# Patient Record
Sex: MALE | Race: WHITE | Employment: FULL TIME | URBAN - METROPOLITAN AREA
[De-identification: names, ages, dates, MRNs, and addresses within clinical notes are randomized per-mention and may not be internally consistent; named-entity substitution may affect disease eponyms.]

---

## 2018-07-18 ENCOUNTER — TELEPHONE (OUTPATIENT)
Dept: ORTHOPEDIC SURGERY | Age: 83
End: 2018-07-18

## 2021-08-27 ENCOUNTER — APPOINTMENT (OUTPATIENT)
Dept: GENERAL RADIOLOGY | Age: 86
End: 2021-08-27
Payer: MEDICARE

## 2021-08-27 ENCOUNTER — APPOINTMENT (OUTPATIENT)
Dept: CT IMAGING | Age: 86
End: 2021-08-27
Payer: MEDICARE

## 2021-08-27 ENCOUNTER — HOSPITAL ENCOUNTER (EMERGENCY)
Age: 86
Discharge: HOME | End: 2021-08-28
Attending: EMERGENCY MEDICINE
Payer: MEDICARE

## 2021-08-27 DIAGNOSIS — R53.1 GENERALIZED WEAKNESS: Primary | ICD-10-CM

## 2021-08-27 DIAGNOSIS — F03.91 DEMENTIA WITH BEHAVIORAL DISTURBANCE, UNSPECIFIED DEMENTIA TYPE: ICD-10-CM

## 2021-08-27 LAB
BASOPHILS ABSOLUTE: 0 K/UL (ref 0–0.2)
BASOPHILS RELATIVE PERCENT: 0.4 %
EOSINOPHILS ABSOLUTE: 0.1 K/UL (ref 0–0.6)
EOSINOPHILS RELATIVE PERCENT: 1 %
HCT VFR BLD CALC: 42.1 % (ref 40.5–52.5)
HEMOGLOBIN: 14 G/DL (ref 13.5–17.5)
LYMPHOCYTES ABSOLUTE: 1.1 K/UL (ref 1–5.1)
LYMPHOCYTES RELATIVE PERCENT: 11.6 %
MCH RBC QN AUTO: 30.6 PG (ref 26–34)
MCHC RBC AUTO-ENTMCNC: 33.2 G/DL (ref 31–36)
MCV RBC AUTO: 92.2 FL (ref 80–100)
MONOCYTES ABSOLUTE: 1 K/UL (ref 0–1.3)
MONOCYTES RELATIVE PERCENT: 10.8 %
NEUTROPHILS ABSOLUTE: 6.9 K/UL (ref 1.7–7.7)
NEUTROPHILS RELATIVE PERCENT: 76.2 %
PDW BLD-RTO: 14.4 % (ref 12.4–15.4)
PLATELET # BLD: 363 K/UL (ref 135–450)
PMV BLD AUTO: 7.6 FL (ref 5–10.5)
RBC # BLD: 4.56 M/UL (ref 4.2–5.9)
WBC # BLD: 9.1 K/UL (ref 4–11)

## 2021-08-27 PROCEDURE — 83690 ASSAY OF LIPASE: CPT

## 2021-08-27 PROCEDURE — 81003 URINALYSIS AUTO W/O SCOPE: CPT

## 2021-08-27 PROCEDURE — 84484 ASSAY OF TROPONIN QUANT: CPT

## 2021-08-27 PROCEDURE — 71045 X-RAY EXAM CHEST 1 VIEW: CPT

## 2021-08-27 PROCEDURE — 80053 COMPREHEN METABOLIC PANEL: CPT

## 2021-08-27 PROCEDURE — 70450 CT HEAD/BRAIN W/O DYE: CPT

## 2021-08-27 PROCEDURE — 93005 ELECTROCARDIOGRAM TRACING: CPT | Performed by: PHYSICIAN ASSISTANT

## 2021-08-27 PROCEDURE — 99283 EMERGENCY DEPT VISIT LOW MDM: CPT

## 2021-08-27 PROCEDURE — 85025 COMPLETE CBC W/AUTO DIFF WBC: CPT

## 2021-08-27 ASSESSMENT — ENCOUNTER SYMPTOMS
NAUSEA: 0
VOMITING: 0
ABDOMINAL PAIN: 0
RHINORRHEA: 0
COUGH: 0
WHEEZING: 0
SHORTNESS OF BREATH: 0
DIARRHEA: 0

## 2021-08-28 VITALS
BODY MASS INDEX: 23.34 KG/M2 | OXYGEN SATURATION: 96 % | WEIGHT: 149 LBS | TEMPERATURE: 98 F | RESPIRATION RATE: 16 BRPM | DIASTOLIC BLOOD PRESSURE: 97 MMHG | SYSTOLIC BLOOD PRESSURE: 189 MMHG | HEART RATE: 71 BPM

## 2021-08-28 LAB
A/G RATIO: 1.1 (ref 1.1–2.2)
ALBUMIN SERPL-MCNC: 3.7 G/DL (ref 3.4–5)
ALP BLD-CCNC: 102 U/L (ref 40–129)
ALT SERPL-CCNC: 11 U/L (ref 10–40)
ANION GAP SERPL CALCULATED.3IONS-SCNC: 13 MMOL/L (ref 3–16)
AST SERPL-CCNC: 21 U/L (ref 15–37)
BILIRUB SERPL-MCNC: 0.4 MG/DL (ref 0–1)
BILIRUBIN URINE: NEGATIVE
BLOOD, URINE: NEGATIVE
BUN BLDV-MCNC: 25 MG/DL (ref 7–20)
CALCIUM SERPL-MCNC: 9 MG/DL (ref 8.3–10.6)
CHLORIDE BLD-SCNC: 100 MMOL/L (ref 99–110)
CLARITY: CLEAR
CO2: 21 MMOL/L (ref 21–32)
COLOR: YELLOW
CREAT SERPL-MCNC: 1.1 MG/DL (ref 0.8–1.3)
EKG ATRIAL RATE: 75 BPM
EKG DIAGNOSIS: NORMAL
EKG P AXIS: 32 DEGREES
EKG P-R INTERVAL: 190 MS
EKG Q-T INTERVAL: 414 MS
EKG QRS DURATION: 80 MS
EKG QTC CALCULATION (BAZETT): 462 MS
EKG R AXIS: 15 DEGREES
EKG T AXIS: 83 DEGREES
EKG VENTRICULAR RATE: 75 BPM
GFR AFRICAN AMERICAN: >60
GFR NON-AFRICAN AMERICAN: >60
GLOBULIN: 3.5 G/DL
GLUCOSE BLD-MCNC: 102 MG/DL (ref 70–99)
GLUCOSE URINE: NEGATIVE MG/DL
KETONES, URINE: NEGATIVE MG/DL
LEUKOCYTE ESTERASE, URINE: NEGATIVE
LIPASE: 48 U/L (ref 13–60)
MICROSCOPIC EXAMINATION: NORMAL
NITRITE, URINE: NEGATIVE
PH UA: 5.5 (ref 5–8)
POTASSIUM SERPL-SCNC: 4.4 MMOL/L (ref 3.5–5.1)
PROTEIN UA: NEGATIVE MG/DL
SODIUM BLD-SCNC: 134 MMOL/L (ref 136–145)
SPECIFIC GRAVITY UA: 1.02 (ref 1–1.03)
TOTAL PROTEIN: 7.2 G/DL (ref 6.4–8.2)
TROPONIN: <0.01 NG/ML
URINE REFLEX TO CULTURE: NORMAL
URINE TYPE: NORMAL
UROBILINOGEN, URINE: 0.2 E.U./DL

## 2021-08-28 PROCEDURE — 93010 ELECTROCARDIOGRAM REPORT: CPT | Performed by: INTERNAL MEDICINE

## 2021-08-28 NOTE — ED TRIAGE NOTES
Pt lives alone and family states they live 1.5hrs away and last checked on pt approxx. 2 weeks ago. Pt has seemingly \"declined\" with ADLs significantly.

## 2021-08-28 NOTE — ED PROVIDER NOTES
I independently performed a history and physical on Supriya Bennett. All diagnostic, treatment, and disposition decisions were made by myself in conjunction with the advanced practice provider. Briefly, this is a 80 y.o. male here for few weeks of mental status changes. He is an 59-year-old gentleman who lives alone after his wife . He has some family who live about an hour and half away to check on balance a week. Over the past few weeks to months has been going downhill became more forgetful having more episodes of incontinence having hard to keep his house clean and wife still drives to the grocery store and drives to [place] to get around and has been going downhill. Today with his niece was visiting him to go out to lunch at the fish is a big boy a  there who sees her father daily basis was able to relate these been coming in for 5 times a day and forgetting that he already been there. On exam, he is well-appearing male no acute distress. Is awake and alert to name and year and location. Month or day he cannot give. Reasonable insight. States he has no complaints. Heart regular lungs clear abdomen soft neuro is nonfocal besides the obvious dementia. EKG   EKG is reviewed myself. Dated today . Rate 75 normal sinus rhythm is a PAC. Normal EKG. N compared to his prior EKG from 2012 at that time he had diffuse ischemic changes in the lateral precordial leads are not present today     Hattie Chawla MD  21 [id number]        Screenings              MDM  He is demented unfortunately. His labs and CT imaging are benign. His urinalysis shows no acute process. This juncture is safe for discharge home but is going to need help with other home care or facilities near future. Family at bedside are aware and are trying to arrange this with patient.     Patient Referrals:  Rico Nicolas, [address] [id number] [phone]            Discharge Medications:  New Prescriptions    No medications on file       FINAL IMPRESSION  1. Generalized weakness    2. Dementia with behavioral disturbance, unspecified dementia type (HCC)        Blood pressure (!) 188/99, pulse 75, temperature 98 °F (36.7 °C), resp. rate 17, weight 149 lb (67.6 kg), SpO2 98 %. For further details of [facility] emergency department encounter, please see documentation by advanced practice provider, Elijah Osman.          Jose Enrique Bundy MD  08/28/21 8345

## 2022-03-05 ENCOUNTER — APPOINTMENT (OUTPATIENT)
Dept: CT IMAGING | Age: 87
End: 2022-03-05
Payer: MEDICARE

## 2022-03-05 ENCOUNTER — HOSPITAL ENCOUNTER (OUTPATIENT)
Age: 87
Setting detail: OBSERVATION
Discharge: NURSING, REHAB OR LONG-TERM CARE | End: 2022-03-07
Attending: EMERGENCY MEDICINE | Admitting: FAMILY MEDICINE
Payer: MEDICARE

## 2022-03-05 DIAGNOSIS — R53.1 GENERALIZED WEAKNESS: Primary | ICD-10-CM

## 2022-03-05 DIAGNOSIS — R77.8 ELEVATED TROPONIN: ICD-10-CM

## 2022-03-05 PROBLEM — R79.89 ELEVATED TROPONIN: Status: ACTIVE | Noted: 2022-03-05

## 2022-03-05 LAB
A/G RATIO: 1.1 (ref 1.1–2.2)
ALBUMIN SERPL-MCNC: 3.5 G/DL (ref 3.4–5)
ALP BLD-CCNC: 165 U/L (ref 40–129)
ALT SERPL-CCNC: 20 U/L (ref 10–40)
ANION GAP SERPL CALCULATED.3IONS-SCNC: 12 MMOL/L (ref 3–16)
AST SERPL-CCNC: 33 U/L (ref 15–37)
BASOPHILS ABSOLUTE: 0 K/UL (ref 0–0.2)
BASOPHILS RELATIVE PERCENT: 0.3 %
BILIRUB SERPL-MCNC: <0.2 MG/DL (ref 0–1)
BUN BLDV-MCNC: 23 MG/DL (ref 7–20)
CALCIUM SERPL-MCNC: 8.9 MG/DL (ref 8.3–10.6)
CHLORIDE BLD-SCNC: 101 MMOL/L (ref 99–110)
CO2: 24 MMOL/L (ref 21–32)
CREAT SERPL-MCNC: 1.3 MG/DL (ref 0.8–1.3)
EOSINOPHILS ABSOLUTE: 0 K/UL (ref 0–0.6)
EOSINOPHILS RELATIVE PERCENT: 0.2 %
GFR AFRICAN AMERICAN: >60
GFR NON-AFRICAN AMERICAN: 52
GLUCOSE BLD-MCNC: 129 MG/DL (ref 70–99)
HCT VFR BLD CALC: 35 % (ref 40.5–52.5)
HEMOGLOBIN: 11.6 G/DL (ref 13.5–17.5)
LYMPHOCYTES ABSOLUTE: 0.5 K/UL (ref 1–5.1)
LYMPHOCYTES RELATIVE PERCENT: 4.8 %
MCH RBC QN AUTO: 29.7 PG (ref 26–34)
MCHC RBC AUTO-ENTMCNC: 33.2 G/DL (ref 31–36)
MCV RBC AUTO: 89.6 FL (ref 80–100)
MONOCYTES ABSOLUTE: 0.9 K/UL (ref 0–1.3)
MONOCYTES RELATIVE PERCENT: 7.8 %
NEUTROPHILS ABSOLUTE: 9.6 K/UL (ref 1.7–7.7)
NEUTROPHILS RELATIVE PERCENT: 86.9 %
PDW BLD-RTO: 13.9 % (ref 12.4–15.4)
PLATELET # BLD: 506 K/UL (ref 135–450)
PMV BLD AUTO: 7 FL (ref 5–10.5)
POTASSIUM REFLEX MAGNESIUM: 4.7 MMOL/L (ref 3.5–5.1)
RBC # BLD: 3.91 M/UL (ref 4.2–5.9)
SODIUM BLD-SCNC: 137 MMOL/L (ref 136–145)
TOTAL CK: 271 U/L (ref 39–308)
TOTAL PROTEIN: 6.8 G/DL (ref 6.4–8.2)
TROPONIN: 0.01 NG/ML
TROPONIN: 0.02 NG/ML
WBC # BLD: 11 K/UL (ref 4–11)

## 2022-03-05 PROCEDURE — 36415 COLL VENOUS BLD VENIPUNCTURE: CPT

## 2022-03-05 PROCEDURE — 72125 CT NECK SPINE W/O DYE: CPT

## 2022-03-05 PROCEDURE — 85025 COMPLETE CBC W/AUTO DIFF WBC: CPT

## 2022-03-05 PROCEDURE — 99284 EMERGENCY DEPT VISIT MOD MDM: CPT

## 2022-03-05 PROCEDURE — 6370000000 HC RX 637 (ALT 250 FOR IP): Performed by: FAMILY MEDICINE

## 2022-03-05 PROCEDURE — 93005 ELECTROCARDIOGRAM TRACING: CPT | Performed by: PHYSICIAN ASSISTANT

## 2022-03-05 PROCEDURE — G0378 HOSPITAL OBSERVATION PER HR: HCPCS

## 2022-03-05 PROCEDURE — 2580000003 HC RX 258: Performed by: FAMILY MEDICINE

## 2022-03-05 PROCEDURE — 6370000000 HC RX 637 (ALT 250 FOR IP): Performed by: PHYSICIAN ASSISTANT

## 2022-03-05 PROCEDURE — 70450 CT HEAD/BRAIN W/O DYE: CPT

## 2022-03-05 PROCEDURE — 82550 ASSAY OF CK (CPK): CPT

## 2022-03-05 PROCEDURE — 80053 COMPREHEN METABOLIC PANEL: CPT

## 2022-03-05 PROCEDURE — 84484 ASSAY OF TROPONIN QUANT: CPT

## 2022-03-05 RX ORDER — SERTRALINE HYDROCHLORIDE 100 MG/1
100 TABLET, FILM COATED ORAL DAILY
COMMUNITY

## 2022-03-05 RX ORDER — ONDANSETRON 4 MG/1
4 TABLET, ORALLY DISINTEGRATING ORAL EVERY 8 HOURS PRN
Status: DISCONTINUED | OUTPATIENT
Start: 2022-03-05 | End: 2022-03-07 | Stop reason: HOSPADM

## 2022-03-05 RX ORDER — LISINOPRIL 20 MG/1
40 TABLET ORAL DAILY
Status: DISCONTINUED | OUTPATIENT
Start: 2022-03-05 | End: 2022-03-07 | Stop reason: HOSPADM

## 2022-03-05 RX ORDER — POLYETHYLENE GLYCOL 3350 17 G/17G
17 POWDER, FOR SOLUTION ORAL DAILY PRN
Status: DISCONTINUED | OUTPATIENT
Start: 2022-03-05 | End: 2022-03-06

## 2022-03-05 RX ORDER — ACETAMINOPHEN 325 MG/1
650 TABLET ORAL EVERY 6 HOURS PRN
Status: DISCONTINUED | OUTPATIENT
Start: 2022-03-05 | End: 2022-03-07 | Stop reason: HOSPADM

## 2022-03-05 RX ORDER — TRAZODONE HYDROCHLORIDE 50 MG/1
50 TABLET ORAL NIGHTLY
COMMUNITY

## 2022-03-05 RX ORDER — SODIUM CHLORIDE 9 MG/ML
25 INJECTION, SOLUTION INTRAVENOUS PRN
Status: DISCONTINUED | OUTPATIENT
Start: 2022-03-05 | End: 2022-03-07 | Stop reason: HOSPADM

## 2022-03-05 RX ORDER — ONDANSETRON 2 MG/ML
4 INJECTION INTRAMUSCULAR; INTRAVENOUS EVERY 6 HOURS PRN
Status: DISCONTINUED | OUTPATIENT
Start: 2022-03-05 | End: 2022-03-07 | Stop reason: HOSPADM

## 2022-03-05 RX ORDER — AMLODIPINE BESYLATE 5 MG/1
10 TABLET ORAL DAILY
Status: DISCONTINUED | OUTPATIENT
Start: 2022-03-05 | End: 2022-03-07 | Stop reason: HOSPADM

## 2022-03-05 RX ORDER — ASPIRIN 81 MG/1
324 TABLET, CHEWABLE ORAL ONCE
Status: COMPLETED | OUTPATIENT
Start: 2022-03-05 | End: 2022-03-05

## 2022-03-05 RX ORDER — SODIUM CHLORIDE 0.9 % (FLUSH) 0.9 %
5-40 SYRINGE (ML) INJECTION PRN
Status: DISCONTINUED | OUTPATIENT
Start: 2022-03-05 | End: 2022-03-07 | Stop reason: HOSPADM

## 2022-03-05 RX ORDER — MESALAMINE 4 G/60ML
4 ENEMA RECTAL NIGHTLY
COMMUNITY

## 2022-03-05 RX ORDER — TRAZODONE HYDROCHLORIDE 50 MG/1
50 TABLET ORAL NIGHTLY
Status: DISCONTINUED | OUTPATIENT
Start: 2022-03-05 | End: 2022-03-07 | Stop reason: HOSPADM

## 2022-03-05 RX ORDER — SODIUM CHLORIDE 0.9 % (FLUSH) 0.9 %
5-40 SYRINGE (ML) INJECTION EVERY 12 HOURS SCHEDULED
Status: DISCONTINUED | OUTPATIENT
Start: 2022-03-05 | End: 2022-03-07 | Stop reason: HOSPADM

## 2022-03-05 RX ORDER — ACETAMINOPHEN 650 MG/1
650 SUPPOSITORY RECTAL EVERY 6 HOURS PRN
Status: DISCONTINUED | OUTPATIENT
Start: 2022-03-05 | End: 2022-03-07 | Stop reason: HOSPADM

## 2022-03-05 RX ADMIN — SERTRALINE 100 MG: 50 TABLET, FILM COATED ORAL at 20:02

## 2022-03-05 RX ADMIN — ASPIRIN 81 MG 324 MG: 81 TABLET ORAL at 18:39

## 2022-03-05 RX ADMIN — LISINOPRIL 40 MG: 20 TABLET ORAL at 20:02

## 2022-03-05 RX ADMIN — TRAZODONE HYDROCHLORIDE 50 MG: 50 TABLET ORAL at 20:02

## 2022-03-05 RX ADMIN — AMLODIPINE BESYLATE 10 MG: 5 TABLET ORAL at 20:02

## 2022-03-05 RX ADMIN — SODIUM CHLORIDE, PRESERVATIVE FREE 10 ML: 5 INJECTION INTRAVENOUS at 20:03

## 2022-03-05 ASSESSMENT — PAIN SCALES - GENERAL: PAINLEVEL_OUTOF10: 0

## 2022-03-05 NOTE — ED PROVIDER NOTES
[facility]  ED      CHIEF COMPLAINT  Fall (pt was sitting in a wc and then staff heard a fall and pt was on the floor. staff state that pt was confuse when found on floor pt is on a dementia unit. pt acting self when put in wc and pt has been self since pt is DNR)      SHARED SERVICE VISIT  Evaluated by myself as well as attending physician Dr. Bosch Veda is a 80 y.o. male history of dementia, DNR; presenting to ED for evaluation of a fall. Patient is coming from a skilled nursing facility. Patient was sitting in the wheelchair and the staff heard a fall. They went to his side and he was on the floor. He is on a dementia unit. He was assisted to the wheelchair and has been acting himself since. Patient is without complaint. Pates alert to self place however not alert to time. He denies any pain on palpation of his body. Moving all limbs freely without complaint. No other complaints, modifying factors or associated symptoms. Nursing notes reviewed. Past Medical History:   Diagnosis Date    Colitis     Depression     Hypertension      Past Surgical History:   Procedure Laterality Date    COLONOSCOPY      yearly since 1989    COLONOSCOPY  6/1/10    poor prep - unable to complete procedure    COLONOSCOPY  7/3/2012    with biopsies    EYE SURGERY      right     History reviewed. No pertinent family history.   Social History     Socioeconomic History    Marital status:      Spouse name: Not on file    Number of children: Not on file    Years of education: Not on file    Highest education level: Not on file   Occupational History    Not on file   Tobacco Use    Smoking status: Never Smoker    Smokeless tobacco: Never Used   Substance and Sexual Activity    Alcohol use: No    Drug use: No    Sexual activity: Not on file   Other Topics Concern    Not on file   Social History Narrative    Not on file     Social Determinants of Health     Financial Resource Strain:     Difficulty of Paying Living Expenses: Not on file   Food Insecurity:     Worried About Running Out of Food in the Last Year: Not on file    Paloma of Food in the Last Year: Not on file   Transportation Needs:     Lack of Transportation (Medical): Not on file    Lack of Transportation (Non-Medical): Not on file   Physical Activity:     Days of Exercise per Week: Not on file    Minutes of Exercise per Session: Not on file   Stress:     Feeling of Stress : Not on file   Social Connections:     Frequency of Communication with Friends and Family: Not on file    Frequency of Social Gatherings with Friends and Family: Not on file    Attends Cheondoism Services: Not on file    Active Member of [address] [place] [address] [employer] or Organizations: Not on file    Attends Club or Organization Meetings: Not on file    Marital Status: Not on file   Intimate Partner Violence:     Fear of Current or Ex-Partner: Not on file    Emotionally Abused: Not on file    Physically Abused: Not on file    Sexually Abused: Not on file   Housing Stability:     Unable to Pay for Housing in the Last Year: Not on file    Number of [place] in the Last Year: Not on file    Unstable Housing in the Last Year: Not on file     No current facility-administered medications for this encounter. Current Outpatient Medications   Medication Sig Dispense Refill    sertraline (ZOLOFT) 100 MG tablet Take 100 mg by mouth daily      traZODone (DESYREL) 50 MG tablet Take 50 mg by mouth nightly      mesalamine (ROWASA) 4 g enema Place 4 g rectally nightly      lisinopril (PRINIVIL;ZESTRIL) 20 MG tablet Take 40 mg by mouth daily.  amLODIPine (NORVASC) 5 MG tablet Take 10 mg by mouth daily.  IRON PO Take  by mouth daily.        No Known Allergies    REVIEW OF SYSTEMS  10 systems reviewed, pertinent positives per HPI otherwise noted to be negative    PHYSICAL EXAM  BP (!) 133/97   Pulse 80 Temp 97.8 °F (36.6 °C) (Oral)   Resp 17   Ht 6' (1.829 m)   Wt 134 lb (60.8 kg)   SpO2 98%   BMI 18.17 kg/m²   GENERAL APPEARANCE: Awake and alert. Cooperative. [place] HEAD: Normocephalic. Atraumatic. EYES: EOM's grossly intact. ENT: Mucous membranes are moist.   NECK: Supple. HEART: RRR. No murmurs. LUNGS: Respirations unlabored. CTAB. Good air exchange. Speaking comfortably in full sentences. ABDOMEN: Soft. Non-distended. Non-tender. No guarding or rebound. No masses. No organomegaly. EXTREMITIES: No peripheral edema. Moves all extremities equally. All extremities neurovascularly intact. SKIN: Warm and dry. No acute rashes. NEUROLOGICAL: Alert to self CN's 2-12 intact. No gross facial drooping. Strength 5/5, sensation intact. PSYCHIATRIC: Normal mood and affect. RADIOLOGY  CT Cervical Spine WO Contrast   Final Result      No evidence of fracture with multilevel degenerative changes as described. There is a small posterior central disc extension at C3-C4 resulting in mild   central canal stenosis. Some mild bony central canal stenosis is seen C4-C7. CT Head WO Contrast   Final Result      1. No evidence of acute intracranial process. 2.  Findings of presumed small vessel ischemic deep white matter disease. 3.  Prominence of the sulci and/or CSF spaces suggests a degree of cerebral   atrophy. 4.  Mild right mastoiditis.              LABS  Labs Reviewed   CBC WITH AUTO DIFFERENTIAL - Abnormal; Notable for the following components:       Result Value    RBC 3.91 (*)     Hemoglobin 11.6 (*)     Hematocrit 35.0 (*)     Platelets 453 (*)     Neutrophils Absolute 9.6 (*)     Lymphocytes Absolute 0.5 (*)     All other components within normal limits   COMPREHENSIVE METABOLIC PANEL W/ REFLEX TO MG FOR LOW K - Abnormal; Notable for the following components:    Glucose 129 (*)     BUN 23 (*)     GFR Non-African American 52 (*)     Alkaline Phosphatase 165 (*)     All other components within normal limits   TROPONIN - Abnormal; Notable for the following components:    Troponin 0.02 (*)     All other components within normal limits   CK   URINALYSIS WITH MICROSCOPIC       PROCEDURES  Unless otherwise noted below, none  Procedures      MDM  MDM  Patient is an 26-year-old male history of dementia present emergency department for evaluation of fall and found down. Arrival to the ED patient was without significant complaint. Vitals within normal limits. Lab work was obtained which did demonstrate an elevated troponin at 0.02. Patient does not have any significant kidney injury to account for this elevation in the troponin. Given his generalized weakness as well as new elevation in troponin will plan to admit patient for serial troponins and observation. CT imaging of the head was obtained which demonstrated no evidence of fracture or acute abnormalities. Please refer to attending note for EKG interpretation. DISPOSITION  Requesting admission/observation    CLINICAL IMPRESSION  1. Generalized weakness    2.  Elevated troponin            Tina Crisostomo PA-C  03/05/22 8411

## 2022-03-05 NOTE — ED NOTES
Pt in from [facility] who state that pt had a un wittiness fall out of a WC. Staff state that pt was confused when found on the floor pt is on a locked down dementia unit. Pt has been acting self when picked up and put in his w/c. Pt is a DNR and has paperwork and POA at bedside. Pt is awake alert to self and place not time. Pt dennis any pain. Pt is unaware of why he is here. Pt moving all extremities well. pt POA wanted to make sure that staff is aware that he has Eligah Corrente and can try to get out of bed and be a little aggressive with staff. Pt is on fall precautions and monitor at this time.        Jaymie Parada RN  03/05/22 5170

## 2022-03-05 NOTE — ED NOTES
Patient identified as a positive fall risk on the ED triage fall screening. Patient placed in fall precautions which includes:  yellow fall risk bracelet on wrist and yellow socks on feet. Patient instructed on importance of not getting out of bed or ambulating without assistance for safety. Pt verbalized understanding.      Zainab Johns RN  03/05/22 8206

## 2022-03-06 PROBLEM — R53.1 GENERALIZED WEAKNESS: Status: ACTIVE | Noted: 2022-03-06

## 2022-03-06 PROBLEM — I95.9 HYPOTENSION: Status: ACTIVE | Noted: 2022-03-06

## 2022-03-06 LAB
ANION GAP SERPL CALCULATED.3IONS-SCNC: 9 MMOL/L (ref 3–16)
BASOPHILS ABSOLUTE: 0 K/UL (ref 0–0.2)
BASOPHILS RELATIVE PERCENT: 0.4 %
BUN BLDV-MCNC: 22 MG/DL (ref 7–20)
CALCIUM SERPL-MCNC: 8.7 MG/DL (ref 8.3–10.6)
CHLORIDE BLD-SCNC: 102 MMOL/L (ref 99–110)
CO2: 26 MMOL/L (ref 21–32)
CREAT SERPL-MCNC: 1 MG/DL (ref 0.8–1.3)
EOSINOPHILS ABSOLUTE: 0.1 K/UL (ref 0–0.6)
EOSINOPHILS RELATIVE PERCENT: 1 %
GFR AFRICAN AMERICAN: >60
GFR NON-AFRICAN AMERICAN: >60
GLUCOSE BLD-MCNC: 93 MG/DL (ref 70–99)
HCT VFR BLD CALC: 33 % (ref 40.5–52.5)
HEMOGLOBIN: 11 G/DL (ref 13.5–17.5)
LYMPHOCYTES ABSOLUTE: 1.2 K/UL (ref 1–5.1)
LYMPHOCYTES RELATIVE PERCENT: 14.5 %
MCH RBC QN AUTO: 30.4 PG (ref 26–34)
MCHC RBC AUTO-ENTMCNC: 33.4 G/DL (ref 31–36)
MCV RBC AUTO: 90.9 FL (ref 80–100)
MONOCYTES ABSOLUTE: 0.9 K/UL (ref 0–1.3)
MONOCYTES RELATIVE PERCENT: 11.1 %
NEUTROPHILS ABSOLUTE: 6.1 K/UL (ref 1.7–7.7)
NEUTROPHILS RELATIVE PERCENT: 73 %
PDW BLD-RTO: 13.6 % (ref 12.4–15.4)
PLATELET # BLD: 403 K/UL (ref 135–450)
PMV BLD AUTO: 6.8 FL (ref 5–10.5)
POTASSIUM REFLEX MAGNESIUM: 3.8 MMOL/L (ref 3.5–5.1)
RBC # BLD: 3.63 M/UL (ref 4.2–5.9)
SODIUM BLD-SCNC: 137 MMOL/L (ref 136–145)
TROPONIN: 0.01 NG/ML
WBC # BLD: 8.3 K/UL (ref 4–11)

## 2022-03-06 PROCEDURE — 96372 THER/PROPH/DIAG INJ SC/IM: CPT

## 2022-03-06 PROCEDURE — 6370000000 HC RX 637 (ALT 250 FOR IP): Performed by: NURSE PRACTITIONER

## 2022-03-06 PROCEDURE — 84484 ASSAY OF TROPONIN QUANT: CPT

## 2022-03-06 PROCEDURE — G0378 HOSPITAL OBSERVATION PER HR: HCPCS

## 2022-03-06 PROCEDURE — 2580000003 HC RX 258: Performed by: FAMILY MEDICINE

## 2022-03-06 PROCEDURE — 6370000000 HC RX 637 (ALT 250 FOR IP): Performed by: FAMILY MEDICINE

## 2022-03-06 PROCEDURE — 80048 BASIC METABOLIC PNL TOTAL CA: CPT

## 2022-03-06 PROCEDURE — 97161 PT EVAL LOW COMPLEX 20 MIN: CPT

## 2022-03-06 PROCEDURE — 97165 OT EVAL LOW COMPLEX 30 MIN: CPT

## 2022-03-06 PROCEDURE — 36415 COLL VENOUS BLD VENIPUNCTURE: CPT

## 2022-03-06 PROCEDURE — 6370000000 HC RX 637 (ALT 250 FOR IP): Performed by: PHYSICIAN ASSISTANT

## 2022-03-06 PROCEDURE — 6360000002 HC RX W HCPCS: Performed by: FAMILY MEDICINE

## 2022-03-06 PROCEDURE — 85025 COMPLETE CBC W/AUTO DIFF WBC: CPT

## 2022-03-06 RX ORDER — QUETIAPINE FUMARATE 50 MG/1
50 TABLET, EXTENDED RELEASE ORAL ONCE
Status: COMPLETED | OUTPATIENT
Start: 2022-03-06 | End: 2022-03-06

## 2022-03-06 RX ORDER — POLYETHYLENE GLYCOL 3350 17 G/17G
17 POWDER, FOR SOLUTION ORAL DAILY
Status: DISCONTINUED | OUTPATIENT
Start: 2022-03-06 | End: 2022-03-07 | Stop reason: HOSPADM

## 2022-03-06 RX ADMIN — QUETIAPINE FUMARATE 50 MG: 50 TABLET, EXTENDED RELEASE ORAL at 21:37

## 2022-03-06 RX ADMIN — ENOXAPARIN SODIUM 40 MG: 40 INJECTION SUBCUTANEOUS at 07:51

## 2022-03-06 RX ADMIN — SERTRALINE 100 MG: 50 TABLET, FILM COATED ORAL at 07:51

## 2022-03-06 RX ADMIN — SODIUM CHLORIDE, PRESERVATIVE FREE 10 ML: 5 INJECTION INTRAVENOUS at 07:52

## 2022-03-06 RX ADMIN — AMLODIPINE BESYLATE 10 MG: 5 TABLET ORAL at 07:52

## 2022-03-06 RX ADMIN — TRAZODONE HYDROCHLORIDE 50 MG: 50 TABLET ORAL at 19:30

## 2022-03-06 RX ADMIN — SODIUM CHLORIDE, PRESERVATIVE FREE 10 ML: 5 INJECTION INTRAVENOUS at 19:30

## 2022-03-06 RX ADMIN — POLYETHYLENE GLYCOL 3350 17 G: 17 POWDER, FOR SOLUTION ORAL at 11:12

## 2022-03-06 ASSESSMENT — PAIN SCALES - GENERAL
PAINLEVEL_OUTOF10: 0

## 2022-03-06 NOTE — PROGRESS NOTES
Occupational Therapy   Occupational Therapy Initial/discharge Assessment  1 x only    Date: 3/6/2022   Patient Name: Danni Edouard  MRN: [id number]     : 1934    Date of Service: 3/6/2022    Discharge Recommendations:  ECF without OT       Assessment      OT Education: OT Role;Plan of Care;Precautions  Patient Education: disease specific: importance of OOB for meals; use of RED/nurse call light for assist with ADL needs & transfers(pt able to demonstrate immmediate recall of Nurse call light);  Barriers to Learning: cognition  No Skilled OT: At baseline function  REQUIRES OT FOLLOW UP: No  Activity Tolerance  Activity Tolerance: Patient Tolerated treatment well  Activity Tolerance: vitals stable:  sitting EOB on RA:  BP = 128/66, standing: BP = 136/66, 92 % O 2 sats; Safety Devices  Safety Devices in place: Yes  Type of devices: Call light within reach; Chair alarm in place; Left in chair;Nurse notified           Patient Diagnosis(es): The primary encounter diagnosis was Generalized weakness. A diagnosis of Elevated troponin was also pertinent to this visit. has a past medical history of Colitis, Depression, and Hypertension. has a past surgical history that includes Colonoscopy; Colonoscopy (6/1/10); eye surgery; and Colonoscopy (7/3/2012).            Restrictions  Restrictions/Precautions  Restrictions/Precautions: General Precautions,Fall Risk,Up as Tolerated  Position Activity Restriction  Other position/activity restrictions: AVASYS, telemetry    Subjective   General  Chart Reviewed: Yes  Patient assessed for rehabilitation services?: Yes  Family / Caregiver Present: No  Referring Practitioner: Dr. Rosalina Quinn  Diagnosis: general weakness, fall out of w/c  General Comment  Comments: RN cleared pt for OT eval; pt sitting EOB with PT  Patient Currently in Pain: Denies    Social/Functional History  Social/Functional History  Lives With: Other (comment) (staff at [facility])  Type of Home: Facility ([facility] [address] [facility] per chart)  ADL Assistance: Needs assistance (patient reports that he is independent with his ADLs. however this is unlikely due to his severe dementia.)  Homemaking Responsibilities: No (staff at memory care unit perform)  Ambulation Assistance: Needs assistance (non-ambulatory per patient.)  Transfer Assistance: Needs assistance (patient unsure if he needs assistance with transfers at baseline)  Active : No  Additional Comments: Patient is an extremely poor historian. Patient could not remember that he lives at [facility]. Patient reports that he does not walk. However he cannot remember if he needs assistance to transfer to a wheelchair or not. Objective        Orientation  Overall Orientation Status: Impaired  Orientation Level: Oriented to person;Oriented to place; Disoriented to time;Disoriented to situation  Observation/Palpation  Posture: Poor  Balance  Sitting Balance: Supervision  Standing Balance: Moderate assistance (of 1 with gait belt)  Standing Balance  Activity: stand-pivot bed-->chair  ADL  Feeding: Supervision  Grooming: Setup (in chair to wash face & hands)  LE Dressing: Maximum assistance (to adjust socks)    RUE Tone: Normotonic  LUE Tone: Normotonic  Coordination  Movements Are Fluid And Coordinated: Yes        Transfers  Stand Pivot Transfers: Moderate assistance (of 1 with gait belt)  Sit to stand: Moderate assistance  Stand to sit: Moderate assistance     Cognition  Overall Cognitive Status: Exceptions  Arousal/Alertness: Delayed responses to stimuli (patient is hard of hearing)  Following Commands: Inconsistently follows commands; Follows one step commands with increased time; Follows one step commands with repetition  Attention Span: Attends with cues to redirect; Difficulty attending to directions  Memory: Decreased recall of recent events;Decreased short term memory  Safety Judgement: Decreased awareness of need for safety;Decreased awareness of need for assistance  Problem Solving: Decreased awareness of errors  Insights: Not aware of deficits  Initiation: Requires cues for all  Sequencing: Requires cues for all        Sensation  Overall Sensation Status: WFL     LUE General AROM: WFL elbow-->hand by observation  RUE General AROM: WFL elbow-->hand by observation        Plan   OT eval only    AM-PAC Score        AM-PAC Inpatient Daily Activity Raw Score: 12 (03/06/22 0931)  AM-PAC Inpatient ADL T-Scale Score : 30.6 (03/06/22 0931)  ADL Inpatient CMS 0-100% Score: 66.57 (03/06/22 0931)  ADL Inpatient CMS G-Code Modifier : CL (03/06/22 2290)    Goals  Patient Goals   Patient goals : unable to verbalize/identify       Therapy Time   Individual Concurrent Group Co-treatment   Time In [facility] 63         Time Out 0843         Minutes 1975 Deniz Puga, OT

## 2022-03-06 NOTE — H&P
Hospital Medicine History & Physical      PCP: No primary care provider on file. Date of Admission: 3/5/2022    Date of Service: Pt seen/examined on 3/5/2022   and  Placed in Observation. Chief Complaint:   Weakness,       History Of Present Illness:       80 y.o. male with PMH of dementia presents from NH with c/o unwitnessed fall,pt was sitting in his wheel chair prior to the fall   Currently  he denies any pain , dizziness, n/v     Past Medical History:          Diagnosis Date    Colitis     Depression     Hypertension        Past Surgical History:          Procedure Laterality Date    COLONOSCOPY      yearly since 1989    COLONOSCOPY  6/1/10    poor prep - unable to complete procedure    COLONOSCOPY  7/3/2012    with biopsies    EYE SURGERY      right       Medications Prior to Admission:      Prior to Admission medications    Medication Sig Start Date End Date Taking? Authorizing Provider   sertraline (ZOLOFT) 100 MG tablet Take 100 mg by mouth daily   Yes Historical Provider, MD   traZODone (DESYREL) 50 MG tablet Take 50 mg by mouth nightly   Yes Historical Provider, MD   mesalamine (ROWASA) 4 g enema Place 4 g rectally nightly   Yes Historical Provider, MD   lisinopril (PRINIVIL;ZESTRIL) 20 MG tablet Take 40 mg by mouth daily. Yes Historical Provider, MD   amLODIPine (NORVASC) 5 MG tablet Take 10 mg by mouth daily. Yes Historical Provider, MD   IRON PO Take  by mouth daily. Yes Historical Provider, MD       Allergies:  Patient has no known allergies. Social History:      The patient currently lives at [address] [place] [address],[address]:   reports that he has never smoked. He has never used smokeless tobacco.  ETOH:   reports no history of alcohol use. E-Cigarettes/Vaping Use     Questions Responses    E-Cigarette/Vaping Use     Start Date     Passive Exposure     Quit Date     Counseling Given     Comments             Family History:       Reviewed in detail and negative for DM, CAD, Cancer, CVA. Positive as follows:    History reviewed. No pertinent family history. REVIEW OF SYSTEMS COMPLETED:   Pertinent positives as noted in the HPI. All other systems reviewed and negative. PHYSICAL EXAM PERFORMED:    BP (!) 153/87   Pulse 84   Temp 98.8 °F (37.1 °C) (Oral)   Resp 18   Ht 6' (1.829 m)   Wt 134 lb (60.8 kg)   SpO2 98%   BMI 18.17 kg/m²     General appearance:  No apparent distress, appears stated age and cooperative. HEENT:  Normal cephalic, atraumatic without obvious deformity. Pupils equal, round, and reactive to light. Extra ocular muscles intact. Conjunctivae/corneas clear. Neck: Supple, with full range of motion. No jugular venous distention. Trachea midline. Respiratory:  Normal respiratory effort. Clear to auscultation, bilaterally without Rales/Wheezes/Rhonchi. Cardiovascular:  Regular rate and rhythm with normal S1/S2 without murmurs, rubs or gallops. Abdomen: Soft, non-tender, non-distended with normal bowel sounds. Musculoskeletal:  No clubbing, cyanosis or edema bilaterally. Skin: Skin color, texture, turgor normal.  No rashes or lesions. Neurologic:  Neurovascularly intact without any focal sensory/motor deficits. Cranial nerves: II-XII intact, grossly non-focal.  Psychiatric:  Alert and oriented, thought content short term memory loss   Capillary Refill: Brisk,3 seconds, normal  Peripheral Pulses: +2 palpable, equal bilaterally       Labs:     Recent Labs     03/05/22  1550   WBC 11.0   HGB 11.6*   HCT 35.0*   *     Recent Labs     03/05/22  1550      K 4.7      CO2 24   BUN 23*   CREATININE 1.3   CALCIUM 8.9     Recent Labs     03/05/22  1550   AST 33   ALT 20   BILITOT <0.2   ALKPHOS 165*     No results for input(s): INR in the last 72 hours.   Recent Labs     03/05/22  1550 03/05/22  1919   CKTOTAL 271  --    TROPONINI 0.02* 0.01       Urinalysis:      Lab Results   Component Value Date    NITRU Negative 08/27/2021    BLOODU Negative 08/27/2021 [place] 1.019 08/27/2021    GLUCOSEU Negative 08/27/2021       Radiology:     CXR: I have reviewed the CXR with the following interpretation: no ac changes   EKG:  I have reviewed the EKG with the following interpretation: no ac changes     CT Cervical Spine WO Contrast   Final Result      No evidence of fracture with multilevel degenerative changes as described. There is a small posterior central disc extension at C3-C4 resulting in mild   central canal stenosis. Some mild bony central canal stenosis is seen C4-C7. CT Head WO Contrast   Final Result      1. No evidence of acute intracranial process. 2.  Findings of presumed small vessel ischemic deep white matter disease. 3.  Prominence of the sulci and/or CSF spaces suggests a degree of cerebral   atrophy. 4.  Mild right mastoiditis. ASSESSMENT:    Active Hospital Problems    Diagnosis Date Noted    Elevated troponin [R77.8] 03/05/2022         PLAN:    Syncope vs unwitnessed fall   Elevated troponin     ekg is wo ac changes  Initial trop is 0.02   Cont to trend   ACS appears less likely     dementia - cont supportive care     HTN - resume home meds     Generalized weakness- get PT/OT eval       DVT Prophylaxis: lovenox  Diet: ADULT DIET; Regular  Code Status: DNR-CCA    PT/OT Eval Status:     Dispo - 2 days        Urban Lo MD    Thank you No primary care provider on file. for the opportunity to be involved in this patient's care. If you have any questions or concerns please feel free to contact me at [phone].

## 2022-03-06 NOTE — CARE COORDINATION
Writer went to room, pt alone in room, sleeping. Chart reviewed, pt is from [facility]. Writer left vmail for admissions, to verify pt can return. Pt in Observation, PT/OT recommending ECF without therapy. Vicente Winkler, [facility] Addendum:  Writer spoke with Niki/admissions at [facility], pt is LTC and can return at discharge.   AUGUSTUS Hernandez-RN

## 2022-03-06 NOTE — PROGRESS NOTES
Assessment complete. VSS. Denies pain. Call light in reach. Bed alarm activated. Avasys in place for pt safety.

## 2022-03-06 NOTE — PROGRESS NOTES
Physical Therapy    Facility/Department: [facility] B3 - MED SURG  Initial Assessment and Discharge Summary    NAME: Antonella Lundberg  : 1934  MRN: [id number]    Date of Service: 3/6/2022    Discharge Recommendations:  ECF without PT (recommend return to his ECF/memory care unit at [facility] with 24/7 assistance)   PT Equipment Recommendations  Equipment Needed: No  Other: defer to patient's facility. Assessment   Assessment: Patient is an 80year old male who presetned to [facility] on 3/5/22 after falling in the dementia unit at [facility]. Patient reports that he is non-ambulatory at baseline and that he needs assistance when getting to his wheelchair. Today the patient completed bed mobility with min assist and transferred from bed to chair with moderate assistance and a rolling walker. Patient reported at the end of the session, Kathrynvermyah Calhoun is about how I normally get out of bed. \" Patient has significant cognitive deficits and has very poor potential to learn from any further therapy education. Patient also appears to be at his baseline level of function. Patient is safe to return to his ECF/dementia unit at [facility], when medically stable, with continued 24/7 assistance with all mobility. No additional skilled acute PT needs. PT signing off. Treatment Diagnosis: Decreased independence with functional mobility  Specific instructions for Next Treatment: N/A PT signing off  Prognosis: Poor  Decision Making: Low Complexity  PT Education: Goals; General Safety; Disease Specific Education;PT Role;Pressure Relief; Injury Prevention;Plan of Care  Patient Education: Disease Specific Education: Patient educated on importance of OOB mobility, prevention of complications of bedrest, and general safety during hospitalization. Patient verbalized understanding but he will need education reinforcement from nursing due to his cognitive status.   Barriers to Learning: impaired cognition  No Skilled PT: No PT goals identified  REQUIRES PT FOLLOW UP: No  Activity Tolerance  Activity Tolerance: Patient Tolerated treatment well;Patient limited by cognitive status  Activity Tolerance: Vitals: 97% on room air. 71 /61       Patient Diagnosis(es): The primary encounter diagnosis was Generalized weakness. A diagnosis of Elevated troponin was also pertinent to this visit. has a past medical history of Colitis, Depression, and Hypertension. has a past surgical history that includes Colonoscopy; Colonoscopy (6/1/10); eye surgery; and Colonoscopy (7/3/2012). Restrictions  Restrictions/Precautions  Restrictions/Precautions: General Precautions,Fall Risk,Up as Tolerated  Position Activity Restriction  Other position/activity restrictions: AVASYS, telemetry  Vision/Hearing  Vision: Within Functional Limits  Hearing: Exceptions to [facility]  Hearing Exceptions: Hard of hearing/hearing concerns     Subjective  General  Chart Reviewed: Yes  Patient assessed for rehabilitation services?: Yes  Additional Pertinent Hx: HPI per chart, Sharon Flores is a 80 y.o. male history of dementia, DNR; presenting to ED for evaluation of a fall. Patient is coming from a skilled nursing facility. Patient was sitting in the wheelchair and the staff heard a fall. They went to his side and he was on the floor. He is on a dementia unit. He was assisted to the wheelchair and has been acting himself since. Patient is without complaint. Patient is alert to self place however not alert to time. CT C Spine: No evidence of fracture with multilevel degenerative changes as described. CT Head: 1. No evidence of acute intracranial process. Initial trop is 0.02.\"  Response To Previous Treatment: Not applicable  Family / Caregiver Present: No  Referring Practitioner: Cleveland Apley, MD  Referral Date : 03/05/22  Follows Commands: Impaired  General Comment  Comments: Supine in bed upon entry of therapy staff.  Cleared for therapy by RN.  Subjective  Subjective: Patient agreed to participate. Pain Screening  Patient Currently in Pain: Denies  Vital Signs  Pulse: 71  Heart Rate Source: Monitor  BP: 121/61  BP Location: Right upper arm  Orthostatic B/P and Pulse?: Yes  Blood Pressure Lyin/61  Pulse Lyin PER MINUTE  Blood Pressure Sittin/66  Pulse Sittin PER MINUTE  Blood Pressure Standin/66  Pulse Standin PER MINUTE  Patient Currently in Pain: Denies  Orthostatic B/P and Pulse?: Yes  Oxygen Therapy  SpO2: 97 %  O2 Device: None (Room air)       Orientation  Orientation  Overall Orientation Status: Impaired  Orientation Level: Oriented to person;Disoriented to place; Disoriented to time;Disoriented to situation (patient [facility] he [facility] at [facility] [place] [address]. did not know month or year.)  Social/Functional History  Social/Functional History  Lives With: Other (comment) (staff at [facility] [place])  Type of Home: Facility ([address] Unit per chart)  ADL Assistance: Needs assistance (patient reports that he is independent with his ADLs. however this is unlikely due to his severe dementia.)  Homemaking Responsibilities: No (staff at memory care unit perform)  Ambulation Assistance: Needs assistance (non-ambulatory per patient.)  Transfer Assistance: Needs assistance (patient unsure if he needs assistance with transfers at baseline)  Active : No  Additional Comments: Patient is an extremely poor historian. Patient could not remember that he lives at [facility]. Patient reports that he does not walk. However he cannot remember if he needs assistance to transfer to a wheelchair or not. Cognition   Cognition  Overall Cognitive Status: Exceptions  Arousal/Alertness: Delayed responses to stimuli (patient is hard of hearing)  Following Commands: Inconsistently follows commands; Follows one step commands with increased time; Follows one step commands with repetition  Attention Span: Attends with cues to redirect; Difficulty attending to directions  Memory: Decreased recall of recent events;Decreased short term memory  Safety Judgement: Decreased awareness of need for safety;Decreased awareness of need for assistance  Problem Solving: Decreased awareness of errors  Insights: Not aware of deficits  Initiation: Requires cues for all  Sequencing: Requires cues for all    Objective     Observation/Palpation  Posture: Poor    PROM RLE (degrees)  RLE PROM: WFL  AROM RLE (degrees)  RLE AROM: WFL  PROM LLE (degrees)  LLE PROM: WFL  AROM LLE (degrees)  LLE AROM : WFL  Strength RLE  Comment: unable to complete formal manual muscle testing due to patient's dementia. however it appeared to be grossly at least 3/5  Strength LLE  Comment: unable to complete formal manual muscle testing due to patient's dementia. however it appeared to be grossly at least 3/5     Sensation  Overall Sensation Status: WFL  Bed mobility  Supine to Sit: Minimal assistance  Sit to Supine: Unable to assess (patient in chair at end of session)  Comment: head of bed elevated  Transfers  Sit to Stand: Moderate Assistance  Stand to sit: Moderate Assistance  Bed to Chair: Moderate assistance  Comment: cueing for safe hand placement. patient performed stand pivot transfer from bed to chair with RW and mod assist. mod assist for RW placement. 1 mild loss of balance. min-mod assist to correct. Ambulation  Ambulation?: No (patient said that he does not ambulate at baseline.)     Balance  Posture: Poor  Sitting - Static: Fair  Sitting - Dynamic: Fair  Standing - Static: Poor  Standing - Dynamic: Poor        Plan   Plan  Times per week: N/A PT signing off  Plan weeks: N/A PT signing off  Specific instructions for Next Treatment: N/A PT signing off  Safety Devices  Type of devices:  All fall risk precautions in place,Call light within reach,Chair alarm in place,Left in chair,Telesitter in use,Nurse notified,Gait belt,Patient at risk for falls    AM-PAC Score     AM-PAC Inpatient Mobility without Stair Climbing Raw Score : 11 (03/06/22 0930)  AM-PAC Inpatient without Stair Climbing T-Scale Score : 35.66 (03/06/22 0930)  Mobility Inpatient CMS 0-100% Score: 67.36 (03/06/22 0930)  Mobility Inpatient without Stair CMS G-Code Modifier : CL (03/06/22 0930)       Goals  Short term goals  Time Frame for Short term goals: 1 session 3/6/22  Short term goal 1: Patient will perform bed mobility with min assist. Goal met 3/6/22  Short term goal 2: Patient will perform sit <> stand and bed to chair transfer with mod assist. Goal met 3/6/22  Patient Goals   Patient goals : No goals stated.        Therapy Time   Individual Concurrent Group Co-treatment   Time In 0822         Time Out 0832         Minutes 10         Timed Code Treatment Minutes: 0 Minutes (10 minute evaluation)       Barbara Oswald PT

## 2022-03-06 NOTE — PROGRESS NOTES
No clubbing, cyanosis or edema bilaterally. Full range of motion without deformity. Skin: Skin color, texture, turgor normal.  No rashes or lesions. Neurologic:  Neurovascularly intact without any focal sensory/motor deficits. Cranial nerves: II-XII intact, grossly non-focal.  Psychiatric: Alert, oriented only to self, poor insight  Capillary Refill: Brisk,3 seconds, normal   Peripheral Pulses: +2 palpable, equal bilaterally       Labs:   Recent Labs     03/05/22  1550 03/06/22  0321   WBC 11.0 8.3   HGB 11.6* 11.0*   HCT 35.0* 33.0*   * 403     Recent Labs     03/05/22  1550 03/06/22  0321    137   K 4.7 3.8    102   CO2 24 26   BUN 23* 22*   CREATININE 1.3 1.0   CALCIUM 8.9 8.7     Recent Labs     03/05/22  1550   AST 33   ALT 20   BILITOT <0.2   ALKPHOS 165*     No results for input(s): INR in the last 72 hours. Recent Labs     03/05/22  1550 03/05/22  1919 03/06/22  0321   CKTOTAL 271  --   --    TROPONINI 0.02* 0.01 0.01       Urinalysis:      Lab Results   Component Value Date    NITRU Negative 08/27/2021    BLOODU Negative 08/27/2021    SPECGRAV 1.019 08/27/2021    GLUCOSEU Negative 08/27/2021       Radiology:  CT Cervical Spine WO Contrast   Final Result      No evidence of fracture with multilevel degenerative changes as described. There is a small posterior central disc extension at C3-C4 resulting in mild   central canal stenosis. Some mild bony central canal stenosis is seen C4-C7. CT Head WO Contrast   Final Result      1. No evidence of acute intracranial process. 2.  Findings of presumed small vessel ischemic deep white matter disease. 3.  Prominence of the sulci and/or CSF spaces suggests a degree of cerebral   atrophy. 4.  Mild right mastoiditis.                  Assessment/Plan:    Active Hospital Problems    Diagnosis     Elevated troponin [R77.8]        Syncope vs unwitnessed fall   Elevated troponin   -EKG demonstrated normal sinus rhythm with PACs  -Serial troponins negative  -ACS very unlikely  -Echocardiogram pending     Dementia  - cont supportive care      Hypertension, controlled  - resume home meds      Generalized weakness  -PT/OT evaluations, recommending ECF without PT    DVT Prophylaxis: Lovenox  Diet: ADULT DIET; Regular  Code Status: DNR-CCA    PT/OT Eval Status:  Following    Dispo -likely 1 to 2 days    [place], [place]

## 2022-03-07 VITALS
TEMPERATURE: 99.4 F | DIASTOLIC BLOOD PRESSURE: 54 MMHG | RESPIRATION RATE: 16 BRPM | HEIGHT: 72 IN | HEART RATE: 80 BPM | BODY MASS INDEX: 21.65 KG/M2 | OXYGEN SATURATION: 92 % | SYSTOLIC BLOOD PRESSURE: 98 MMHG | WEIGHT: 159.83 LBS

## 2022-03-07 LAB
EKG ATRIAL RATE: 82 BPM
EKG DIAGNOSIS: NORMAL
EKG P AXIS: 56 DEGREES
EKG P-R INTERVAL: 196 MS
EKG Q-T INTERVAL: 402 MS
EKG QRS DURATION: 78 MS
EKG QTC CALCULATION (BAZETT): 469 MS
EKG R AXIS: 3 DEGREES
EKG T AXIS: 67 DEGREES
EKG VENTRICULAR RATE: 82 BPM

## 2022-03-07 PROCEDURE — 6360000002 HC RX W HCPCS: Performed by: FAMILY MEDICINE

## 2022-03-07 PROCEDURE — 93010 ELECTROCARDIOGRAM REPORT: CPT | Performed by: INTERNAL MEDICINE

## 2022-03-07 PROCEDURE — G0378 HOSPITAL OBSERVATION PER HR: HCPCS

## 2022-03-07 PROCEDURE — 93308 TTE F-UP OR LMTD: CPT

## 2022-03-07 PROCEDURE — 96374 THER/PROPH/DIAG INJ IV PUSH: CPT

## 2022-03-07 PROCEDURE — 96372 THER/PROPH/DIAG INJ SC/IM: CPT

## 2022-03-07 PROCEDURE — 2580000003 HC RX 258: Performed by: FAMILY MEDICINE

## 2022-03-07 PROCEDURE — 6370000000 HC RX 637 (ALT 250 FOR IP): Performed by: FAMILY MEDICINE

## 2022-03-07 RX ADMIN — SERTRALINE 100 MG: 50 TABLET, FILM COATED ORAL at 09:27

## 2022-03-07 RX ADMIN — ENOXAPARIN SODIUM 40 MG: 40 INJECTION SUBCUTANEOUS at 09:26

## 2022-03-07 RX ADMIN — ONDANSETRON 4 MG: 2 INJECTION INTRAMUSCULAR; INTRAVENOUS at 04:53

## 2022-03-07 RX ADMIN — LISINOPRIL 40 MG: 20 TABLET ORAL at 09:27

## 2022-03-07 RX ADMIN — AMLODIPINE BESYLATE 10 MG: 5 TABLET ORAL at 09:27

## 2022-03-07 RX ADMIN — SODIUM CHLORIDE, PRESERVATIVE FREE 10 ML: 5 INJECTION INTRAVENOUS at 09:27

## 2022-03-07 ASSESSMENT — PAIN SCALES - GENERAL
PAINLEVEL_OUTOF10: 0

## 2022-03-07 NOTE — CARE COORDINATION
Per nursing pt likely to dc today. In the interest of a timely discharge, CM arranged [facility] transport at 1800. Family at bedside and verified plan to return to [facility]. CM updated Suki at facility as to pt dc. CM will fax orders when completed by provider. Primary nurse, Shanti, updated on transport arrival time.

## 2022-03-07 NOTE — DISCHARGE SUMMARY
Hospital Medicine Discharge Summary    Patient ID: Sade Escamilla      Patient's PCP: No primary care provider on file. Admit Date: 3/5/2022     Discharge Date: 3/7/2022      Admitting Provider: Bright Wynne MD     Discharge Provider: ELIZABETH Santos     Discharge Diagnoses: Active Hospital Problems    Diagnosis     Generalized weakness [R53.1]     Hypotension [I95.9]     Elevated troponin [R77.8]        The patient was seen and examined on day of discharge and this discharge summary is in conjunction with any daily progress note from day of discharge. Hospital Course: 80 y. o. male with PMH of dementia presents from NH with c/o unwitnessed fall,pt was sitting in his wheel chair prior to the fall   Currently  he denies any pain , dizziness, n/v      Syncope vs unwitnessed fall   Elevated troponin   -EKG demonstrated normal sinus rhythm with PACs  -Serial troponins negative  -ACS very unlikely  -Echocardiogram unremarkable     Dementia  - cont supportive care      Hypertension, controlled  - resume home meds      Generalized weakness  -PT/OT evaluations, recommending ECF without PT      Physical Exam Performed:     BP (!) 98/54   Pulse 80   Temp 99.4 °F (37.4 °C) (Oral)   Resp 16   Ht 6' (1.829 m)   Wt 159 lb 13.3 oz (72.5 kg)   SpO2 92%   BMI 21.68 kg/m²       General appearance:  No apparent distress, appears stated age and cooperative. HEENT:  Normal cephalic, atraumatic without obvious deformity. Pupils equal, round, and reactive to light. Extra ocular muscles intact. Conjunctivae/corneas clear. Neck: Supple, with full range of motion. No jugular venous distention. Trachea midline. Respiratory:  Normal respiratory effort. Clear to auscultation, bilaterally without Rales/Wheezes/Rhonchi. Cardiovascular:  Regular rate and rhythm with normal S1/S2 without murmurs, rubs or gallops. Abdomen: Soft, non-tender, non-distended with normal bowel sounds.   Musculoskeletal:  No clubbing, cyanosis or edema bilaterally. Full range of motion without deformity. Skin: Skin color, texture, turgor normal.  No rashes or lesions. Neurologic:  Neurovascularly intact without any focal sensory/motor deficits. Cranial nerves: II-XII intact, grossly non-focal.  Psychiatric:  Alert and oriented, thought content appropriate, normal insight  Capillary Refill: Brisk,< 3 seconds   Peripheral Pulses: +2 palpable, equal bilaterally       Labs: For convenience and continuity at follow-up the following most recent labs are provided:      CBC:    Lab Results   Component Value Date    WBC 8.3 03/06/2022    HGB 11.0 03/06/2022    HCT 33.0 03/06/2022     03/06/2022       Renal:    Lab Results   Component Value Date     03/06/2022    K 3.8 03/06/2022     03/06/2022    CO2 26 03/06/2022    BUN 22 03/06/2022    CREATININE 1.0 03/06/2022    CALCIUM 8.7 03/06/2022         Significant Diagnostic Studies    Radiology:   CT Cervical Spine WO Contrast   Final Result      No evidence of fracture with multilevel degenerative changes as described. There is a small posterior central disc extension at C3-C4 resulting in mild   central canal stenosis. Some mild bony central canal stenosis is seen C4-C7. CT Head WO Contrast   Final Result      1. No evidence of acute intracranial process. 2.  Findings of presumed small vessel ischemic deep white matter disease. 3.  Prominence of the sulci and/or CSF spaces suggests a degree of cerebral   atrophy. 4.  Mild right mastoiditis.                 Consults:     IP CONSULT TO HOSPITALIST    Disposition: SNF    Condition at Discharge: Stable    Discharge Instructions/Follow-up:    Follow-up with PCP 1 to 2 weeks    Code Status:  Prior DNR CCA    Activity: activity as tolerated    Diet: regular diet      Discharge Medications:     Discharge Medication List as of 3/7/2022  4:35 PM           Details   sertraline (ZOLOFT) 100 MG tablet Take 100 mg by mouth dailyHistorical Med      traZODone (DESYREL) 50 MG tablet Take 50 mg by mouth nightlyHistorical Med      mesalamine (ROWASA) 4 g enema Place 4 g rectally nightlyHistorical Med      lisinopril (PRINIVIL;ZESTRIL) 20 MG tablet Take 40 mg by mouth daily. amLODIPine (NORVASC) 5 MG tablet Take 10 mg by mouth daily. IRON PO Take  by mouth daily. Time Spent on discharge is more than 30 minutes in the examination, evaluation, counseling and review of medications and discharge plan. Signed:    Barkley Jeans, PA   3/9/2022      Thank you No primary care provider on file. for the opportunity to be involved in this patient's care. If you have any questions or concerns please feel free to contact me at [phone].

## 2022-03-07 NOTE — DISCHARGE INSTR - COC
Continuity of Care Form    Patient Name: Coleen Duncan   :  1934  MRN:  [id number]    Admit date:  3/5/2022  Discharge date:  22     Code Status Order: DNR-CCA   Advance Directives:      Admitting Physician:  Elton Bui MD  PCP: No primary care provider on file.     Discharging Nurse: James Sneed Unit/Room#: 4942/2075-29  Discharging Unit Phone Number: [phone]    Emergency Contact:   Extended Emergency Contact Information  Primary Emergency Contact: [facility]  Address: [address] [place] [address]  Home Phone: [phone]  Relation: Spouse  Secondary Emergency Contact: [facility]  Address: [address] [place] [address]  Home Phone: [phone]  Relation: Spouse    Past Surgical History:  Past Surgical History:   Procedure Laterality Date    COLONOSCOPY      yearly since     COLONOSCOPY  6/1/10    poor prep - unable to complete procedure    COLONOSCOPY  7/3/2012    with biopsies    EYE SURGERY      right       Immunization History:   Immunization History   Administered Date(s) Administered    Influenza Virus Vaccine 10/26/2011    Pneumococcal Conjugate 7-valent (Gita Hones) 10/26/2011       Active Problems:  Patient Active Problem List   Diagnosis Code    Elevated troponin R77.8    Generalized weakness R53.1    Hypotension I95.9       Isolation/Infection:   Isolation            No Isolation          Patient Infection Status       None to display            Nurse Assessment:  Last Vital Signs: /74   Pulse 104   Temp 98.5 °F (36.9 °C) (Oral)   Resp 16   Ht 6' (1.829 m)   Wt 159 lb 13.3 oz (72.5 kg)   SpO2 92%   BMI 21.68 kg/m²     Last documented pain score (0-10 scale): Pain Level: 0  Last Weight:   Wt Readings from Last 1 Encounters:   22 159 lb 13.3 oz (72.5 kg)     Mental Status:  disoriented    IV Access:  - None    Nursing Mobility/ADLs:  Walking   Assisted  Transfer  Assisted  Bathing Assisted  Dressing  Assisted  Toileting  Dependent  Feeding  Assisted  Med Admin  Assisted  Med Delivery   whole    Wound Care Documentation and Therapy:        Elimination:  Continence: Bowel: No  Bladder: No  Urinary Catheter: None   Colostomy/Ileostomy/Ileal Conduit: No       Date of Last BM: 3/6/22    Intake/Output Summary (Last 24 hours) at 3/7/2022 1230  Last data filed at 3/7/2022 0904  Gross per 24 hour   Intake 720 ml   Output --   Net 720 ml     I/O last 3 completed shifts: In: 1320 [P.O.:1320]  Out: -     Safety Concerns:     Sundowners [place], History of Falls (last 30 days), and At Risk for Falls    Impairments/Disabilities:      None    Nutrition Therapy:  Current Nutrition Therapy:   - Oral Diet:  General    Routes of Feeding: Oral  Liquids: No Restrictions  Daily Fluid Restriction: no  Last Modified Barium Swallow with Video (Video Swallowing Test): not done    Treatments at the Time of Hospital Discharge:   Respiratory Treatments: ***  Oxygen Therapy:  is not on home oxygen therapy.   Ventilator:    - No ventilator support    Rehab Therapies: Physical Therapy and Occupational Therapy  Weight Bearing Status/Restrictions: No weight bearing restirctions  Other Medical Equipment (for information only, NOT a DME order):  wheelchair, walker, bedside commode, and hospital bed  Other Treatments: ***    Patient's personal belongings (please select all that are sent with patient):  None    RN SIGNATURE:  Electronically signed by Issa Bernabe RN on 3/7/22 at 4:27 PM EST    CASE MANAGEMENT/SOCIAL WORK SECTION    Inpatient Status Date: OVA 3/5/22    Readmission Risk Assessment Score:  Readmission Risk              Risk of Unplanned Readmission:  0         Discharging to Facility/ Agency   Name: [facility]is[facility] Grace  Address:  Phone: [phone]  Fax: [phone]    / signature: Electronically signed by Daphnie Tadeo RN on 3/7/22 at 1:18 PM EST    PHYSICIAN SECTION    Prognosis: Good    Condition at Discharge: Stable    Rehab Potential (if transferring to Rehab): Good    Recommended Labs or Other Treatments After Discharge: Follow-up with PCP in 1-2 weeks. Continue PT/OT evaluations  Recheck labs in 1 week    Physician Certification: I certify the above information and transfer of Coleen Duncan  is necessary for the continuing treatment of the diagnosis listed and that he requires [place] for less 30 days.      Update Admission H&P: No change in H&P    PHYSICIAN SIGNATURE:  Electronically signed by ELIZABETH Patel on 3/7/22 at 12:30 PM EST

## 2022-03-07 NOTE — CARE COORDINATION
CASE MANAGEMENT DISCHARGE SUMMARY      Discharge to: skilled to [facility]    Precertification completed:  [facility] Exemption Notification (HENS) completed: n/a. Pt is returning     IMM given: (date) na - OVA status    New Durable Medical Equipment ordered/agency: na    Transportation:    Medical Transport explained to [employer]. Pt/family voice no agency preference. Agency used: [facility]  up time: 1800   Ambulance form completed: Yes    Confirmed discharge plan with:     Patient: yes     Family:  Yes - Silverio Allen, at bedside     Facility/Agency, name:  [facility] faxed to [facility] and admissions team, Suki, notified     Phone number for report to facility: [phone]     RN, name: Shanti    Note: Discharging nurse to complete [facility], reconcile AVS, and place final copy with patient's discharge packet. RN to ensure that written prescriptions for Level II medications are sent with patient to the facility as per protocol.   Shy Hermosillo RN

## 2022-03-07 NOTE — PROGRESS NOTES
Comprehensive Nutrition Assessment    Type and Reason for Visit:  Initial    Nutrition Recommendations/Plan:   Continue regular diet   Encourage po intakes  Monitor po intakes, nutrition adequacy, weights, pertinent labs, BMs    Nutrition Assessment:  Pt with PMH of dementia presents from NH with c/o unwitnessed fall. Pt currently on a regular diet with po intakes % per EMR. Pt unable to provide much HX. Pt endorses good appetite now and PTA. Pt reports that he has been eating most of his meals. Unsure of weight changes. No evidence of weight loss in EMR. Will continue current diet and monitor need for ONS. Malnutrition Assessment:  Malnutrition Status: At risk for malnutrition (Comment)      Estimated Daily Nutrient Needs:  Energy (kcal):  9136-5787; Weight Used for Energy Requirements:  Current     Protein (g):  73-87; Weight Used for Protein Requirements:  Current        Fluid (ml/day): 1 ml/kcal      Nutrition Related Findings:  +1 pitting BLE edema. Patient alert, oriented only to self. Wounds:  None       Current Nutrition Therapies:    ADULT DIET;  Regular    Anthropometric Measures:  · Height: 6' (182.9 cm)  · Current Body Weight: 159 lb 13.3 oz (72.5 kg)   · Ideal Body Weight: 178 lbs  · BMI: 21.7  · BMI Categories: Underweight (BMI less than 22) age over 72       Nutrition Diagnosis:   · No nutrition diagnosis at this time      Nutrition Interventions:   Food and/or Nutrient Delivery:  Continue Current Diet  Nutrition Education/Counseling:  No recommendation at this time   Coordination of Nutrition Care:  Continue to monitor while inpatient    Goals:  Pt will have po intakes 50% or greater this admission       Nutrition Monitoring and Evaluation:   Behavioral-Environmental Outcomes:  None Identified   Food/Nutrient Intake Outcomes:  Food and Nutrient Intake  Physical Signs/Symptoms Outcomes:  Weight     Discharge Planning:    Continue current diet     Electronically signed by Robby Reyes Martínez Nice RD, LD on 3/7/22 at 3:38 PM EST    Contact: [phone]

## 2022-03-08 NOTE — PLAN OF CARE
Problem: Falls - Risk of:  Goal: Will remain free from falls  Description: Will remain free from falls  Outcome: Completed  Goal: Absence of physical injury  Description: Absence of physical injury  Outcome: Completed     Problem: Confusion - Acute:  Goal: Absence of continued neurological deterioration signs and symptoms  Description: Absence of continued neurological deterioration signs and symptoms  Outcome: Completed  Goal: Mental status will be restored to baseline  Description: Mental status will be restored to baseline  Outcome: Completed     Problem: Discharge Planning:  Goal: Ability to perform activities of daily living will improve  Description: Ability to perform activities of daily living will improve  Outcome: Completed  Goal: Participates in care planning  Description: Participates in care planning  Outcome: Completed     Problem: Injury - Risk of, Physical Injury:  Goal: Will remain free from falls  Description: Will remain free from falls  Outcome: Completed  Goal: Absence of physical injury  Description: Absence of physical injury  Outcome: Completed     Problem: Mood - Altered:  Goal: Mood stable  Description: Mood stable  Outcome: Completed  Goal: Absence of abusive behavior  Description: Absence of abusive behavior  Outcome: Completed  Goal: Verbalizations of feeling emotionally comfortable while being cared for will increase  Description: Verbalizations of feeling emotionally comfortable while being cared for will increase  Outcome: Completed     Problem: Psychomotor Activity - Altered:  Goal: Absence of psychomotor disturbance signs and symptoms  Description: Absence of psychomotor disturbance signs and symptoms  Outcome: Completed     Problem: Sensory Perception - Impaired:  Goal: Demonstrations of improved sensory functioning will increase  Description: Demonstrations of improved sensory functioning will increase  Outcome: Completed  Goal: Decrease in sensory misperception frequency  Description: Decrease in sensory misperception frequency  Outcome: Completed  Goal: Able to refrain from responding to false sensory perceptions  Description: Able to refrain from responding to false sensory perceptions  Outcome: Completed  Goal: Demonstrates accurate environmental perceptions  Description: Demonstrates accurate environmental perceptions  Outcome: Completed  Goal: Able to distinguish between reality-based and nonreality-based thinking  Description: Able to distinguish between reality-based and nonreality-based thinking  Outcome: Completed  Goal: Able to interrupt nonreality-based thinking  Description: Able to interrupt nonreality-based thinking  Outcome: Completed     Problem: Sleep Pattern Disturbance:  Goal: Appears well-rested  Description: Appears well-rested  Outcome: Completed     Problem: Skin Integrity:  Goal: Will show no infection signs and symptoms  Description: Will show no infection signs and symptoms  Outcome: Completed  Goal: Absence of new skin breakdown  Description: Absence of new skin breakdown  Outcome: Completed

## 2022-04-05 PROBLEM — R77.8 ELEVATED TROPONIN: Status: RESOLVED | Noted: 2022-03-05 | Resolved: 2022-04-05

## 2022-04-05 PROBLEM — R79.89 ELEVATED TROPONIN: Status: RESOLVED | Noted: 2022-03-05 | Resolved: 2022-04-05

## 2023-11-04 ENCOUNTER — APPOINTMENT (OUTPATIENT)
Dept: GENERAL RADIOLOGY | Age: 88
End: 2023-11-04
Payer: MEDICARE

## 2023-11-04 ENCOUNTER — APPOINTMENT (OUTPATIENT)
Dept: CT IMAGING | Age: 88
End: 2023-11-04
Payer: MEDICARE

## 2023-11-04 ENCOUNTER — HOSPITAL ENCOUNTER (EMERGENCY)
Age: 88
Discharge: HOME | End: 2023-11-04
Payer: MEDICARE

## 2023-11-04 VITALS
HEART RATE: 91 BPM | SYSTOLIC BLOOD PRESSURE: 159 MMHG | OXYGEN SATURATION: 100 % | TEMPERATURE: 99 F | RESPIRATION RATE: 17 BRPM | DIASTOLIC BLOOD PRESSURE: 83 MMHG

## 2023-11-04 DIAGNOSIS — T14.8XXA HEMATOMA: ICD-10-CM
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
## 2023-11-04 LAB
ANION GAP SERPL CALCULATED.3IONS-SCNC: 11 MMOL/L (ref 3–16)
BUN SERPL-MCNC: 18 MG/DL (ref 7–20)
CALCIUM SERPL-MCNC: 8.3 MG/DL (ref 8.3–10.6)
CHLORIDE SERPL-SCNC: 102 MMOL/L (ref 99–110)
CO2 SERPL-SCNC: 22 MMOL/L (ref 21–32)
CREAT SERPL-MCNC: 1.1 MG/DL (ref 0.8–1.3)
EKG ATRIAL RATE: 77 BPM
EKG DIAGNOSIS: NORMAL
EKG P AXIS: 77 DEGREES
EKG P-R INTERVAL: 202 MS
EKG Q-T INTERVAL: 422 MS
EKG QRS DURATION: 80 MS
EKG QTC CALCULATION (BAZETT): 477 MS
EKG R AXIS: -8 DEGREES
EKG T AXIS: 70 DEGREES
EKG VENTRICULAR RATE: 77 BPM
GFR SERPLBLD CREATININE-BSD FMLA CKD-EPI: >60 ML/MIN/{1.73_M2}
GLUCOSE SERPL-MCNC: 114 MG/DL (ref 70–99)
POTASSIUM SERPL-SCNC: 4.2 MMOL/L (ref 3.5–5.1)
SODIUM SERPL-SCNC: 135 MMOL/L (ref 136–145)
TROPONIN, HIGH SENSITIVITY: 27 NG/L (ref 0–22)
TROPONIN, HIGH SENSITIVITY: 28 NG/L (ref 0–22)

## 2023-11-04 PROCEDURE — 72125 CT NECK SPINE W/O DYE: CPT

## 2023-11-04 PROCEDURE — 93010 ELECTROCARDIOGRAM REPORT: CPT | Performed by: INTERNAL MEDICINE

## 2023-11-04 PROCEDURE — 99285 EMERGENCY DEPT VISIT HI MDM: CPT

## 2023-11-04 PROCEDURE — 80048 BASIC METABOLIC PNL TOTAL CA: CPT

## 2023-11-04 PROCEDURE — 70498 CT ANGIOGRAPHY NECK: CPT

## 2023-11-04 PROCEDURE — 93005 ELECTROCARDIOGRAM TRACING: CPT | Performed by: PHYSICIAN ASSISTANT

## 2023-11-04 PROCEDURE — 6360000004 HC RX CONTRAST MEDICATION: Performed by: PHYSICIAN ASSISTANT

## 2023-11-04 PROCEDURE — 70450 CT HEAD/BRAIN W/O DYE: CPT

## 2023-11-04 PROCEDURE — 71046 X-RAY EXAM CHEST 2 VIEWS: CPT

## 2023-11-04 PROCEDURE — 84484 ASSAY OF TROPONIN QUANT: CPT

## 2023-11-04 RX ADMIN — IOPAMIDOL 75 ML: 755 INJECTION, SOLUTION INTRAVENOUS at 14:17

## 2023-11-04 NOTE — ED NOTES
Pt came back from CT, pt was soiled, changed out of pants and brief, placed in fresh gown and brief with new sheets as well.  Pt given warm blanket and resting comfortably at this time      Kerline Lawrence, RN  11/04/23 4623

## 2023-11-04 NOTE — ED PROVIDER NOTES
The Ekg interpreted by me shows  Sinus rhythm with premature atrial complexes with a rate of 77  Axis is normal   QTc is prolonged  477  Intervals and Durations are unremarkable. ST Segments: nonspecific st changes lateral leads, similar to 03/05/22 perhaps slightly more pronounced. I did not see or evaluate this patient. This is ekg interpretation only.         Nirmala Barber MD  11/04/23 7066

## 2023-11-04 NOTE — ED NOTES
Surgifoam and 4x4 placed on patients head wound, pt tolerated well.       Selam Benedict RN  11/04/23 0018

## 2023-11-04 NOTE — ED NOTES
Clean lac on left side of head with ivan-hex and saline.  Flush with 60 ml of saline     Giovanny Salas  11/04/23 3999

## 2023-11-08 NOTE — ED PROVIDER NOTES
[facility] Emergency Department    CHIEF COMPLAINT  Fall (From EGS, unwitness fall, pt hx of dementia and dysphasia, doesn't speak much pt at baseline mentally per facility, no thinners, LAC noted on head )      SHARED SERVICE VISIT  Evaluated by ROLO. My supervising physician was available for consultation. HISTORY OF PRESENT ILLNESS  Kirstie Muse is a 80 y.o. male who presents to the ED via after experiencing an unwitnessed fall earlier today. He has a history of dementia with aphasia and does not speak much at baseline. He is not currently on any blood thinners. Staff from facility states that they walked in the patient's room and he was found on the ground with a laceration located to the left side of his head. No other complaints from facility or patient, however patient is unable to give me any history. No other complaints, modifying factors or associated symptoms. Nursing notes reviewed. Past Medical History:   Diagnosis Date    Anemia     Chronic kidney disease     Colitis     Dementia ([address])     Depression     Dysphasia     Hypertension     PVD (peripheral vascular disease) ([address])     UC (ulcerative colitis) ([address])      Past Surgical History:   Procedure Laterality Date    COLONOSCOPY      yearly since 1989    COLONOSCOPY  6/1/10    poor prep - unable to complete procedure    COLONOSCOPY  7/3/2012    with biopsies    EYE SURGERY      right     No family history on file.   Social History     Socioeconomic History    Marital status:      Spouse name: Not on file    Number of children: Not on file    Years of education: Not on file    Highest education level: Not on file   Occupational History    Not on file   Tobacco Use    Smoking status: Never    Smokeless tobacco: Never   Substance and Sexual Activity    Alcohol use: No    Drug use: No    Sexual activity: Not on file   Other Topics Concern    Not on file   Social History Narrative    Not on file     Social

## 2023-11-16 ENCOUNTER — HOSPITAL ENCOUNTER (EMERGENCY)
Age: 88
Discharge: HOME | End: 2023-11-16
Attending: STUDENT IN AN ORGANIZED HEALTH CARE EDUCATION/TRAINING PROGRAM
Payer: MEDICARE

## 2023-11-16 ENCOUNTER — APPOINTMENT (OUTPATIENT)
Dept: CT IMAGING | Age: 88
End: 2023-11-16
Payer: MEDICARE

## 2023-11-16 VITALS
OXYGEN SATURATION: 92 % | DIASTOLIC BLOOD PRESSURE: 73 MMHG | HEART RATE: 81 BPM | TEMPERATURE: 98 F | SYSTOLIC BLOOD PRESSURE: 151 MMHG | RESPIRATION RATE: 18 BRPM

## 2023-11-16 DIAGNOSIS — S01.81XA FACIAL LACERATION, INITIAL ENCOUNTER: ICD-10-CM

## 2023-11-16 DIAGNOSIS — S09.90XA CLOSED HEAD INJURY, INITIAL ENCOUNTER: Primary | ICD-10-CM

## 2023-11-16 PROCEDURE — 12011 RPR F/E/E/N/L/M 2.5 CM/<: CPT

## 2023-11-16 PROCEDURE — 99284 EMERGENCY DEPT VISIT MOD MDM: CPT

## 2023-11-16 PROCEDURE — 70450 CT HEAD/BRAIN W/O DYE: CPT

## 2023-11-16 PROCEDURE — 72125 CT NECK SPINE W/O DYE: CPT

## 2023-11-16 ASSESSMENT — PAIN - FUNCTIONAL ASSESSMENT: PAIN_FUNCTIONAL_ASSESSMENT: ADULT NONVERBAL PAIN SCALE (NPVS)

## 2023-11-16 NOTE — ED NOTES
[facility] transport at bedside to transport patient back to nursing facility.       Wily Kim RN  11/16/23 1248

## 2023-11-16 NOTE — DISCHARGE INSTRUCTIONS
The nearest ED if you develop severe headache, nausea and vomiting, confusion, other concerning symptoms. Follow-up with PCP in 5 days to have the sutures removed.

## 2023-11-16 NOTE — ED NOTES
Report called to nurse at [facility]leenLloyd.        Janella Dance, RN  11/16/23 3197

## 2023-11-16 NOTE — ED PROVIDER NOTES
[address] [place] [address]  ED      CHIEF COMPLAINT  Fall Shae Armstrong forward out of wheelchair, resides at [facility], laceration to forehead, has severe dementia, no loc)       HISTORY OF PRESENT ILLNESS  Vani Valentino is a 80 y.o. male  who presents to the ED by EMS from his SNF complaining of a fall in which he hit his forehead. Patient reportedly has severe dementia and is unable to provide history. Per EMS report, facility denied any loss of consciousness. Patient does have a small laceration on his forehead. Currently denying any pain. No other complaints, modifying factors or associated symptoms. I have reviewed the following from the nursing documentation. Past Medical History:   Diagnosis Date    Anemia     Chronic kidney disease     Colitis     Dementia ([address])     Depression     Dysphasia     Hypertension     PVD (peripheral vascular disease) ([address])     UC (ulcerative colitis) ([address])      Past Surgical History:   Procedure Laterality Date    COLONOSCOPY      yearly since 1989    COLONOSCOPY  6/1/10    poor prep - unable to complete procedure    COLONOSCOPY  7/3/2012    with biopsies    EYE SURGERY      right     History reviewed. No pertinent family history.   Social History     Socioeconomic History    Marital status:      Spouse name: Not on file    Number of children: Not on file    Years of education: Not on file    Highest education level: Not on file   Occupational History    Not on file   Tobacco Use    Smoking status: Never    Smokeless tobacco: Never   Substance and Sexual Activity    Alcohol use: No    Drug use: No    Sexual activity: Not on file   Other Topics Concern    Not on file   Social History Narrative    Not on file     Social Determinants of Health     Financial Resource Strain: Not on file   Food Insecurity: Not on file   Transportation Needs: Not on file   Physical Activity: Not on file   Stress: Not on file   Social Connections: Not on file   Intimate

## 2023-11-18 ENCOUNTER — HOSPITAL ENCOUNTER (EMERGENCY)
Age: 88
Discharge: HOME | End: 2023-11-18
Payer: MEDICARE

## 2023-11-18 ENCOUNTER — APPOINTMENT (OUTPATIENT)
Dept: CT IMAGING | Age: 88
End: 2023-11-18
Payer: MEDICARE

## 2023-11-18 ENCOUNTER — APPOINTMENT (OUTPATIENT)
Dept: GENERAL RADIOLOGY | Age: 88
End: 2023-11-18
Payer: MEDICARE

## 2023-11-18 VITALS
HEIGHT: 72 IN | DIASTOLIC BLOOD PRESSURE: 80 MMHG | RESPIRATION RATE: 18 BRPM | BODY MASS INDEX: 21.67 KG/M2 | HEART RATE: 91 BPM | OXYGEN SATURATION: 97 % | TEMPERATURE: 98.1 F | WEIGHT: 160 LBS | SYSTOLIC BLOOD PRESSURE: 135 MMHG
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
## 2023-11-18 PROCEDURE — 99284 EMERGENCY DEPT VISIT MOD MDM: CPT

## 2023-11-18 PROCEDURE — 72125 CT NECK SPINE W/O DYE: CPT

## 2023-11-18 PROCEDURE — 71045 X-RAY EXAM CHEST 1 VIEW: CPT

## 2023-11-18 PROCEDURE — 70450 CT HEAD/BRAIN W/O DYE: CPT

## 2023-11-18 RX ORDER — BENZONATATE 100 MG/1
100 CAPSULE ORAL 3 TIMES DAILY PRN
Qty: 30 CAPSULE | Refills: 0 | Status: SHIPPED | OUTPATIENT
Start: 2023-11-18 | End: 2023-11-28

## 2023-11-18 ASSESSMENT — PAIN SCALES - GENERAL: PAINLEVEL_OUTOF10: 0

## 2023-11-18 ASSESSMENT — PAIN - FUNCTIONAL ASSESSMENT: PAIN_FUNCTIONAL_ASSESSMENT: 0-10

## 2023-11-20 NOTE — ED PROVIDER NOTES
reduce the radiation dose to as low as reasonably achievable. COMPARISON: 11/04/2023. HISTORY: ORDERING SYSTEM PROVIDED HISTORY: fall, head injury TECHNOLOGIST PROVIDED HISTORY: Reason for exam:->fall, head injury Has a \"code stroke\" or \"stroke alert\" been called? ->No Decision Support Exception - unselect if not a suspected or confirmed emergency medical condition->Emergency Medical Condition (MA) Reason for Exam: fell out of wheelchair-forehead laceration-no LOC Additional signs and symptoms: dementia FINDINGS: BRAIN/VENTRICLES: There is no acute intracranial hemorrhage, mass effect or midline shift. No abnormal extra-axial fluid collection. The gray-white differentiation is maintained without evidence of an acute infarct. There is no evidence of hydrocephalus. Stable generalized prominence of the ventricular and cisternal spaces. Decreased attenuation in the periventricular and subcortical white matter most consistent with small vessel ischemic change. Intracranial atherosclerotic vascular calcification. ORBITS: The visualized portion of the orbits demonstrate no acute abnormality. SINUSES: The visualized paranasal sinuses and mastoid air cells demonstrate no acute abnormality. SOFT TISSUES/SKULL:  Frontal scalp hematoma, left greater than right with subcutaneous gas. No radiopaque foreign body. No acute calvarial abnormality. No acute intracranial abnormality. Stable generalized cerebral atrophy and chronic small vessel white matter ischemic change. Scalp hematoma in the frontal region, left greater than right. No acute calvarial abnormality       No results found.     PROCEDURES   Unless otherwise noted below, none     Procedures    CRITICAL CARE TIME (.cctime)       PAST MEDICAL HISTORY      has a past medical history of Anemia, Chronic kidney disease, Colitis, Dementia ([address]), Depression, Dysphasia, Falls, Hypertension, Macular degeneration, Mixed receptive-expressive language disorder, PVD

## 2023-11-20 NOTE — CARE COORDINATION
Referred to patient per family request.  Family thinking of moving patient to another facility. Spoke to Nano, patient's [employer]. Patient with dementia. Patient has been at [facility] for 3 years. Patient with 3 falls in the last 2 weeks. Family meeting with facility tomorrow to discuss. Encouraged this and per family, patient is falling at the same time every day due to falling asleep in w/c. She will discuss. Patient is at facility LTC under medicaid. Discussed other facilities and most likely a waiting list for medicaid bed. Discussed Michael. Supportive counseling provided. No other needs at this time.   Electronically signed by DANIELA Reed on 11/20/2023 at 11:41 AM